# Patient Record
Sex: MALE | Race: WHITE | NOT HISPANIC OR LATINO | Employment: UNEMPLOYED | URBAN - METROPOLITAN AREA
[De-identification: names, ages, dates, MRNs, and addresses within clinical notes are randomized per-mention and may not be internally consistent; named-entity substitution may affect disease eponyms.]

---

## 2017-02-15 ENCOUNTER — OFFICE VISIT (OUTPATIENT)
Dept: FAMILY MEDICINE CLINIC | Facility: CLINIC | Age: 57
End: 2017-02-15

## 2017-02-15 VITALS
TEMPERATURE: 97.6 F | HEART RATE: 97 BPM | DIASTOLIC BLOOD PRESSURE: 80 MMHG | SYSTOLIC BLOOD PRESSURE: 140 MMHG | BODY MASS INDEX: 28.93 KG/M2 | WEIGHT: 180 LBS | RESPIRATION RATE: 18 BRPM | OXYGEN SATURATION: 92 % | HEIGHT: 66 IN

## 2017-02-15 DIAGNOSIS — Z72.0 TOBACCO ABUSE: ICD-10-CM

## 2017-02-15 DIAGNOSIS — K21.00 GASTROESOPHAGEAL REFLUX DISEASE WITH ESOPHAGITIS: ICD-10-CM

## 2017-02-15 DIAGNOSIS — J43.9 PULMONARY EMPHYSEMA, UNSPECIFIED EMPHYSEMA TYPE (HCC): ICD-10-CM

## 2017-02-15 DIAGNOSIS — R40.0 DAYTIME SOMNOLENCE: ICD-10-CM

## 2017-02-15 DIAGNOSIS — T17.308A CHOKING, INITIAL ENCOUNTER: ICD-10-CM

## 2017-02-15 DIAGNOSIS — J98.01 BRONCHOSPASM: Primary | ICD-10-CM

## 2017-02-15 PROCEDURE — 71020 XR CHEST PA AND LATERAL: CPT | Performed by: FAMILY MEDICINE

## 2017-02-15 PROCEDURE — 99214 OFFICE O/P EST MOD 30 MIN: CPT | Performed by: FAMILY MEDICINE

## 2017-02-15 RX ORDER — ALBUTEROL SULFATE 90 UG/1
2 AEROSOL, METERED RESPIRATORY (INHALATION) EVERY 4 HOURS PRN
Qty: 1 INHALER | Refills: 11 | Status: SHIPPED | OUTPATIENT
Start: 2017-02-15 | End: 2017-04-11 | Stop reason: SDUPTHER

## 2017-02-15 NOTE — PROGRESS NOTES
SUBJECTIVE:  The patient is a 56-year-old white male who is brought in by his caregiver because of choking symptoms shortness of breath and wheezing.  The patient has an old head injury.  He has a history of COPD.  At one time he was on inhalers but stopped taking them.  He continues to smoke cigarettes.  The patient himself is not an excellent historian.    PAST MEDICAL HISTORY:  Reviewed.    REVIEW OF SYSTEMS:  Please see above; 14 point ROS otherwise negative.      OBJECTIVE: Vitals signs are reviewed and are stable.    HEENT: PERRLA.   Throat and oral pharynx clear.  Old scar present over the forehead.  Neck:  Supple.    Lungs:  Bilateral rhonchi and scattered expiratory wheezes are noted.   Heart:  Regular rate and rhythm.    Abdomen:   Soft, nontender.    Extremities:  No cyanosis, clubbing or edema.    Chest x-ray is done here and interpreted by me.  Indication bronchospasm tobacco abuse.  No old x-ray for comparison.  Trace shows    ASSESSMENT:     Suspect GERD with esophagitis  Bronchospasm  Probable sleep apnea  Tobacco abuse  History of head trauma    PLAN:   The patient will have a trial of Dulera 2 puffs twice a day.  He will take albuterol 2 puffs every 4 hours when necessary wheezing.  He will be referred for sleep study to a pulmonology group.  He will take omeprazole 40 mg daily.  He is strongly advised to discontinue tobacco usage.  We will consider Chantix after current workup.  He has caregiver advised to go the emergency room if any problems.    Much of this encounter note is an electronic transcription/translation of spoken language to printed text.  The electronic translation of spoken language may permit erroneous, or at times, nonsensical words or phrases to be inadvertently transcribed.  Although I have reviewed the note for such errors, some may still exist.

## 2017-04-11 ENCOUNTER — OFFICE VISIT (OUTPATIENT)
Dept: FAMILY MEDICINE CLINIC | Facility: CLINIC | Age: 57
End: 2017-04-11

## 2017-04-11 VITALS
SYSTOLIC BLOOD PRESSURE: 168 MMHG | TEMPERATURE: 98 F | HEART RATE: 78 BPM | BODY MASS INDEX: 28.93 KG/M2 | RESPIRATION RATE: 22 BRPM | WEIGHT: 180 LBS | DIASTOLIC BLOOD PRESSURE: 90 MMHG | OXYGEN SATURATION: 89 % | HEIGHT: 66 IN

## 2017-04-11 DIAGNOSIS — J98.01 BRONCHOSPASM: ICD-10-CM

## 2017-04-11 DIAGNOSIS — H11.31 CONJUNCTIVAL HEMORRHAGE, RIGHT: Primary | ICD-10-CM

## 2017-04-11 PROCEDURE — 99213 OFFICE O/P EST LOW 20 MIN: CPT | Performed by: FAMILY MEDICINE

## 2017-04-11 PROCEDURE — 94640 AIRWAY INHALATION TREATMENT: CPT | Performed by: FAMILY MEDICINE

## 2017-04-11 RX ORDER — ALBUTEROL SULFATE 90 UG/1
2 AEROSOL, METERED RESPIRATORY (INHALATION) EVERY 4 HOURS PRN
Qty: 1 INHALER | Refills: 11 | Status: SHIPPED | OUTPATIENT
Start: 2017-04-11 | End: 2017-06-20 | Stop reason: SDUPTHER

## 2017-04-11 RX ORDER — ALBUTEROL SULFATE 2.5 MG/3ML
2.5 SOLUTION RESPIRATORY (INHALATION) ONCE
Status: COMPLETED | OUTPATIENT
Start: 2017-04-11 | End: 2017-04-11

## 2017-04-11 RX ADMIN — ALBUTEROL SULFATE 2.5 MG: 2.5 SOLUTION RESPIRATORY (INHALATION) at 10:37

## 2017-04-11 NOTE — PROGRESS NOTES
SUBJECTIVE:  The patient is a 56-year-old white male who woke up this morning with a red eye.  It is not painful.  He did not injure it.     PAST MEDICAL HISTORY:  Reviewed.    REVIEW OF SYSTEMS:  Please see above; 14 point ROS otherwise negative.      OBJECTIVE: Vitals signs are reviewed and are stable.    HEENT: PERRLA.  Discs sharp.  A sub-conjunctival redness is noted on the anterior and lateral part of the eye.  No tenderness.  Neck:  Supple.    Lungs: Diminished breath sounds bilaterally with scattered wheezes.  Heart:  Regular rate and rhythm.    Abdomen:   Soft, nontender.    Extremities:  No cyanosis, clubbing or edema.      ASSESSMENT:     Acute right sub-conjunctival hemorrhage  Bronchospasm  COPD    PLAN:   The patient has not been using his inhaler.  His O2 sat is 89.  A mini neb of albuterol was given in the office.  Observation of the sub-conjunctival hemorrhage.  I discussed the nature of this with the caregiver.  He is to use his inhaler as prescribed.  She will call if problems.  The patient will benefit from a nebulizer at home due to hypoxemia and COPD.    Much of this encounter note is an electronic transcription/translation of spoken language to printed text.  The electronic translation of spoken language may permit erroneous, or at times, nonsensical words or phrases to be inadvertently transcribed.  Although I have reviewed the note for such errors, some may still exist.

## 2017-04-17 ENCOUNTER — TELEPHONE (OUTPATIENT)
Dept: FAMILY MEDICINE CLINIC | Facility: CLINIC | Age: 57
End: 2017-04-17

## 2017-04-17 DIAGNOSIS — J98.01 BRONCHOSPASM: ICD-10-CM

## 2017-04-17 DIAGNOSIS — R09.02 HYPOXEMIA: ICD-10-CM

## 2017-04-17 DIAGNOSIS — J44.1 CHRONIC OBSTRUCTIVE PULMONARY DISEASE WITH ACUTE EXACERBATION (HCC): Primary | ICD-10-CM

## 2017-04-17 RX ORDER — ALBUTEROL SULFATE 0.63 MG/3ML
1 SOLUTION RESPIRATORY (INHALATION) EVERY 6 HOURS PRN
Qty: 100 AMPULE | Refills: 12 | Status: SHIPPED | OUTPATIENT
Start: 2017-04-17 | End: 2017-04-18 | Stop reason: SDUPTHER

## 2017-04-17 NOTE — TELEPHONE ENCOUNTER
----- Message from Blake Topete MD sent at 4/17/2017  1:56 PM EDT -----  Contact: DALI LINDA [phone]  I did this.  It seemed to have worked but you may want to check in be sure.  ----- Message -----     From: Anastasiia Burns MA     Sent: 4/17/2017   1:37 PM       To: Blake Topete MD        ----- Message -----     From: Blanca Mejias     Sent: 4/17/2017   1:24 PM       To: Anastasiia Burns MA    PATIENT HAD A BREATHING TREATMENT WHEN HE WAS IN, HE WOULD LIKE TO GET AN RX FOR THE MACHINE AT HOME.    LMOM orders faxed to [facility] and Albuterol sent to pharmacy

## 2017-04-18 RX ORDER — ALBUTEROL SULFATE 0.63 MG/3ML
1 SOLUTION RESPIRATORY (INHALATION) EVERY 6 HOURS PRN
Qty: 100 AMPULE | Refills: 12 | Status: SHIPPED | OUTPATIENT
Start: 2017-04-18 | End: 2017-06-26

## 2017-04-25 ENCOUNTER — OFFICE VISIT (OUTPATIENT)
Dept: FAMILY MEDICINE CLINIC | Facility: CLINIC | Age: 57
End: 2017-04-25

## 2017-04-25 ENCOUNTER — TELEPHONE (OUTPATIENT)
Dept: FAMILY MEDICINE CLINIC | Facility: CLINIC | Age: 57
End: 2017-04-25

## 2017-04-25 VITALS
OXYGEN SATURATION: 89 % | WEIGHT: 179 LBS | HEART RATE: 92 BPM | TEMPERATURE: 98 F | HEIGHT: 66 IN | DIASTOLIC BLOOD PRESSURE: 98 MMHG | SYSTOLIC BLOOD PRESSURE: 164 MMHG | BODY MASS INDEX: 28.77 KG/M2

## 2017-04-25 DIAGNOSIS — L03.116 CELLULITIS OF LEFT LEG: ICD-10-CM

## 2017-04-25 DIAGNOSIS — J44.9 CHRONIC OBSTRUCTIVE PULMONARY DISEASE, UNSPECIFIED COPD TYPE (HCC): Primary | ICD-10-CM

## 2017-04-25 PROCEDURE — 99213 OFFICE O/P EST LOW 20 MIN: CPT | Performed by: FAMILY MEDICINE

## 2017-04-25 RX ORDER — CEPHALEXIN 500 MG/1
500 CAPSULE ORAL 4 TIMES DAILY
Qty: 40 CAPSULE | Refills: 0 | Status: SHIPPED | OUTPATIENT
Start: 2017-04-25 | End: 2017-06-20 | Stop reason: SDUPTHER

## 2017-04-25 NOTE — PROGRESS NOTES
"SUBJECTIVE:  The patient is [] 56-year-old white male is here for follow-up.  He was seen in the emergency department at [facility] last week.  He was treated for a swollen leg.  His venous Doppler was \"normal\" however I have not seen the official copy of this at the time of this dictation.  Patient has COPD and continues to smoke.    PAST MEDICAL HISTORY:  Reviewed.    REVIEW OF SYSTEMS:  Please see above; 14 point ROS otherwise negative.      OBJECTIVE: Vitals signs are reviewed and are stable.  His O2 sats are always on the lower side.  Today they're 89  HEENT: PERRLA.  []  Neck:  Supple.  []  Lungs:  Clear.    Heart:  Regular rate and rhythm.  []  Abdomen:   Soft, nontender.  []  Extremities:  The left lower extremity has swelling of the ankle and tib-fib region.  There is redness present laterally and posteriorly.  There is tenderness to palpation laterally and posteriorly.  Homans sign is equivocal.    ASSESSMENT:    []Cellulitis left lower extremity.  COPD    PLAN:  []I will track down the official reading.  Should it be negative then the patient is prescribed Keflex 500 mg 4 times a day.  Elevate the leg.  Provider the patient will call me in a couple days to see what the progress is slight.  Should the symptoms worsen or any problems she will notify me sooner or go the emergency room.  The patient was prescribed a nebulizer machine about a week ago though it's not been delivered.  This will be checked on the day.  He will use his inhaler until the nebulizer is delivered.  This happens been confirmed will be delivered tomorrow.    Much of this encounter note is an electronic transcription/translation of spoken language to printed text.  The electronic translation of spoken language may permit erroneous, or at times, nonsensical words or phrases to be inadvertently transcribed.  Although I have reviewed the note for such errors, some may still exist.  "

## 2017-06-20 ENCOUNTER — OFFICE VISIT (OUTPATIENT)
Dept: FAMILY MEDICINE CLINIC | Facility: CLINIC | Age: 57
End: 2017-06-20

## 2017-06-20 VITALS
HEIGHT: 64 IN | WEIGHT: 174 LBS | OXYGEN SATURATION: 90 % | DIASTOLIC BLOOD PRESSURE: 100 MMHG | BODY MASS INDEX: 29.71 KG/M2 | SYSTOLIC BLOOD PRESSURE: 170 MMHG | TEMPERATURE: 97.7 F | HEART RATE: 88 BPM

## 2017-06-20 DIAGNOSIS — Z72.0 TOBACCO ABUSE: ICD-10-CM

## 2017-06-20 DIAGNOSIS — Z11.59 NEED FOR HEPATITIS C SCREENING TEST: ICD-10-CM

## 2017-06-20 DIAGNOSIS — R35.0 URINARY FREQUENCY: ICD-10-CM

## 2017-06-20 DIAGNOSIS — J44.9 CHRONIC OBSTRUCTIVE PULMONARY DISEASE, UNSPECIFIED COPD TYPE (HCC): ICD-10-CM

## 2017-06-20 DIAGNOSIS — S09.90XD HEAD TRAUMA, SUBSEQUENT ENCOUNTER: ICD-10-CM

## 2017-06-20 DIAGNOSIS — I10 ESSENTIAL HYPERTENSION: ICD-10-CM

## 2017-06-20 DIAGNOSIS — L02.11 ABSCESS, NECK: ICD-10-CM

## 2017-06-20 DIAGNOSIS — Z00.00 MEDICARE ANNUAL WELLNESS VISIT, INITIAL: Primary | ICD-10-CM

## 2017-06-20 LAB
ALBUMIN SERPL-MCNC: 3.9 G/DL (ref 3.5–5.2)
ALBUMIN/GLOB SERPL: 1.1 G/DL
ALP SERPL-CCNC: 87 U/L (ref 39–117)
ALT SERPL W P-5'-P-CCNC: 22 U/L (ref 1–41)
ANION GAP SERPL CALCULATED.3IONS-SCNC: 13.3 MMOL/L
AST SERPL-CCNC: 26 U/L (ref 1–40)
BACTERIA UR QL AUTO: NORMAL /HPF
BILIRUB SERPL-MCNC: 0.6 MG/DL (ref 0.1–1.2)
BILIRUB UR QL STRIP: NEGATIVE
BUN BLD-MCNC: 5 MG/DL (ref 6–20)
BUN/CREAT SERPL: 6.4 (ref 7–25)
CALCIUM SPEC-SCNC: 9.1 MG/DL (ref 8.6–10.5)
CHLORIDE SERPL-SCNC: 95 MMOL/L (ref 98–107)
CHOLEST SERPL-MCNC: 174 MG/DL (ref 0–200)
CLARITY UR: CLEAR
CO2 SERPL-SCNC: 33.7 MMOL/L (ref 22–29)
COLOR UR: YELLOW
CREAT BLD-MCNC: 0.78 MG/DL (ref 0.76–1.27)
ERYTHROCYTE [DISTWIDTH] IN BLOOD BY AUTOMATED COUNT: 15 % (ref 4.5–15)
GFR SERPL CREATININE-BSD FRML MDRD: 102 ML/MIN/1.73
GLOBULIN UR ELPH-MCNC: 3.4 GM/DL
GLUCOSE BLD-MCNC: 103 MG/DL (ref 65–99)
GLUCOSE UR STRIP-MCNC: NEGATIVE MG/DL
HCT VFR BLD AUTO: 52.9 % (ref 35–60)
HCV AB SER DONR QL: NORMAL
HDLC SERPL-MCNC: 48 MG/DL (ref 40–60)
HGB BLD-MCNC: 17.8 G/DL (ref 13.5–18)
HGB UR QL STRIP.AUTO: NEGATIVE
KETONES UR QL STRIP: NEGATIVE
LDLC SERPL CALC-MCNC: 97 MG/DL (ref 0–100)
LDLC/HDLC SERPL: 2.01 {RATIO}
LEUKOCYTE ESTERASE UR QL STRIP.AUTO: NEGATIVE
LYMPHOCYTES # BLD AUTO: 2.1 10*3/MM3 (ref 1.2–3.4)
LYMPHOCYTES NFR BLD AUTO: 36.9 % (ref 21–51)
MCH RBC QN AUTO: 31.4 PG (ref 26.1–33.1)
MCHC RBC AUTO-ENTMCNC: 33.7 G/DL (ref 33–37)
MCV RBC AUTO: 93 FL (ref 80–99)
MONOCYTES # BLD AUTO: 0.2 10*3/MM3 (ref 0.1–0.6)
MONOCYTES NFR BLD AUTO: 3 % (ref 2–9)
NEUTROPHILS # BLD AUTO: 3.4 10*3/MM3 (ref 1.4–6.5)
NEUTROPHILS NFR BLD AUTO: 60.1 % (ref 42–75)
NITRITE UR QL STRIP: NEGATIVE
PH UR STRIP.AUTO: 7 [PH] (ref 4.6–8)
PLATELET # BLD AUTO: 198 10*3/MM3 (ref 150–450)
PMV BLD AUTO: 6.5 FL (ref 7.1–10.5)
POTASSIUM BLD-SCNC: 5 MMOL/L (ref 3.5–5.2)
PROT SERPL-MCNC: 7.3 G/DL (ref 6–8.5)
PROT UR QL STRIP: ABNORMAL
PSA SERPL-MCNC: 0.68 NG/ML (ref 0–4)
RBC # BLD AUTO: 5.68 10*6/MM3 (ref 4–6)
RBC # UR: NORMAL /HPF
REF LAB TEST METHOD: NORMAL
SODIUM BLD-SCNC: 142 MMOL/L (ref 136–145)
SP GR UR STRIP: 1.01 (ref 1–1.03)
SQUAMOUS #/AREA URNS HPF: NORMAL /HPF
TRIGL SERPL-MCNC: 147 MG/DL (ref 0–150)
TSH SERPL DL<=0.05 MIU/L-ACNC: 2.06 MIU/ML (ref 0.27–4.2)
UROBILINOGEN UR QL STRIP: ABNORMAL
VLDLC SERPL-MCNC: 29.4 MG/DL (ref 5–40)
WBC NRBC COR # BLD: 5.6 10*3/MM3 (ref 4.5–10)
WBC UR QL AUTO: NORMAL /HPF

## 2017-06-20 PROCEDURE — G0438 PPPS, INITIAL VISIT: HCPCS | Performed by: NURSE PRACTITIONER

## 2017-06-20 PROCEDURE — 80053 COMPREHEN METABOLIC PANEL: CPT | Performed by: NURSE PRACTITIONER

## 2017-06-20 PROCEDURE — 84153 ASSAY OF PSA TOTAL: CPT | Performed by: NURSE PRACTITIONER

## 2017-06-20 PROCEDURE — 80061 LIPID PANEL: CPT | Performed by: NURSE PRACTITIONER

## 2017-06-20 PROCEDURE — 85025 COMPLETE CBC W/AUTO DIFF WBC: CPT | Performed by: NURSE PRACTITIONER

## 2017-06-20 PROCEDURE — 81001 URINALYSIS AUTO W/SCOPE: CPT | Performed by: NURSE PRACTITIONER

## 2017-06-20 PROCEDURE — 99214 OFFICE O/P EST MOD 30 MIN: CPT | Performed by: NURSE PRACTITIONER

## 2017-06-20 PROCEDURE — 84443 ASSAY THYROID STIM HORMONE: CPT | Performed by: NURSE PRACTITIONER

## 2017-06-20 PROCEDURE — 86803 HEPATITIS C AB TEST: CPT | Performed by: NURSE PRACTITIONER

## 2017-06-20 RX ORDER — LISINOPRIL 20 MG/1
20 TABLET ORAL DAILY
Qty: 30 TABLET | Refills: 1 | Status: SHIPPED | OUTPATIENT
Start: 2017-06-20 | End: 2017-07-11

## 2017-06-20 RX ORDER — ALBUTEROL SULFATE 90 UG/1
2 AEROSOL, METERED RESPIRATORY (INHALATION) EVERY 4 HOURS PRN
Qty: 1 INHALER | Refills: 11 | Status: SHIPPED | OUTPATIENT
Start: 2017-06-20 | End: 2017-06-26

## 2017-06-20 RX ORDER — CEPHALEXIN 500 MG/1
500 CAPSULE ORAL 3 TIMES DAILY
Qty: 30 CAPSULE | Refills: 0 | Status: SHIPPED | OUTPATIENT
Start: 2017-06-20 | End: 2017-07-11

## 2017-06-20 NOTE — PATIENT INSTRUCTIONS
medicare wellness today, check labs and call with results, refill albuterol, start lisinopril 20 mg daily and monitor BP at home and log, erx keflex 500 mg TID x 10 days and if persist will schedule FU with sgn for I&D, enc smoking cessation  Medicare Wellness  Personal Prevention Plan of Service     Date of Office Visit:  2017  Encounter Provider:  CELIO Benson  Place of Service:  [facility]  Patient Name: Sarkis Jaramillo  :  1960    As part of the Medicare Wellness portion of your visit today, we are providing you with this personalized preventive plan of services (PPPS). This plan is based upon recommendations of the [place] Preventive Services Task Force (USPSTF) and the Advisory Committee on Immunization Practices (ACIP).    This lists the preventive care services that should be considered, and provides dates of when you are due. Items listed as completed are up-to-date and do not require any further intervention.    Health Maintenance   Topic Date Due   • TDAP/TD VACCINES (1 - Tdap) 1977   • HEPATITIS C SCREENING  2016   • MEDICARE ANNUAL WELLNESS  2016   • INFLUENZA VACCINE  2017   • COLONOSCOPY  2027   • PNEUMOCOCCAL VACCINE (19-64 MEDIUM RISK)  Addressed       Orders Placed This Encounter   Procedures   • Comprehensive Metabolic Panel   • Lipid Panel   • TSH   • PSA   • Hepatitis C Antibody   • CBC Auto Differential   • Urinalysis   • Urinalysis, Microscopic Only   • CBC & Differential     Order Specific Question:   Manual Differential     Answer:   No   • Urinalysis With Microscopic       No Follow-up on file.

## 2017-06-20 NOTE — PROGRESS NOTES
Subjective   Sarkis Jaramillo is a 56 y.o. male.     History of Present Illness   C/o nodule on back of neck x 6 days enlarging and tender with pus and blood, here with caregiver comes three days a week and has been putting warm compresses to help open up but still there, was recently seen by podiatry last few week was on abx and prev was seen here for cellulitis on B LE 04/17, no fevers, some neck pain, no HA, with cont elevated BP, brought log from home ranging [phone] no CP dizziness HA LE edema, no prev HTN med, unclear of fam hx, poor historian s/t head trauma hx, with COPD on albuterol inhaler and nebulizer prn, request refill inhaler today, still smoking doesn't want to quit    The following portions of the patient's history were reviewed and updated as appropriate: allergies, current medications, past family history, past medical history, past social history, past surgical history and problem list.    Review of Systems   Constitutional: Negative for fever.   Respiratory: Negative for cough, shortness of breath and wheezing.    Cardiovascular: Negative for chest pain, palpitations and leg swelling.   Musculoskeletal: Positive for neck pain. Negative for neck stiffness.   Skin: Positive for wound.   Neurological: Negative for dizziness and headaches.   All other systems reviewed and are negative.      Objective   Physical Exam   Constitutional: He is oriented to person, place, and time. He appears well-developed and well-nourished.   HENT:   Head: Normocephalic and atraumatic.   Eyes: Conjunctivae and EOM are normal. Pupils are equal, round, and reactive to light.   Cardiovascular: Normal rate, regular rhythm and normal heart sounds.    Pulmonary/Chest: Effort normal. He has wheezes (throughout all lung fields).   Musculoskeletal: Normal range of motion.   Neurological: He is alert and oriented to person, place, and time.   Skin: Skin is warm and dry. There is erythema (firm bulla/nodule, with scant  bleeding, no pus dc, no rash, tender).   Psychiatric: He has a normal mood and affect. His behavior is normal. Judgment and thought content normal.   Vitals reviewed.      Assessment/Plan   Sarkis was seen today for cyst and hypertension.    Diagnoses and all orders for this visit:    Medicare annual wellness visit, initial    Abscess, neck    Essential hypertension  -     CBC & Differential  -     Comprehensive Metabolic Panel  -     Lipid Panel  -     TSH  -     Urinalysis With Microscopic  -     CBC Auto Differential  -     Urinalysis  -     Urinalysis, Microscopic Only    Need for hepatitis C screening test  -     Hepatitis C Antibody    Urinary frequency  -     PSA    Chronic obstructive pulmonary disease, unspecified COPD type    Head trauma, subsequent encounter    Tobacco abuse    Other orders  -     cephalexin (KEFLEX) 500 MG capsule; Take 1 capsule by mouth 3 (Three) Times a Day.  -     lisinopril (PRINIVIL,ZESTRIL) 20 MG tablet; Take 1 tablet by mouth Daily.  -     albuterol (PROVENTIL HFA) 108 (90 BASE) MCG/ACT inhaler; Inhale 2 puffs Every 4 (Four) Hours As Needed for Wheezing.    medicare wellness today, check labs and call with results, refill albuterol, start lisinopril 20 mg daily and monitor BP at home and log, erx keflex 500 mg TID x 10 days and if persist will schedule FU with sgn for I&D, enc smoking cessation

## 2017-06-20 NOTE — PROGRESS NOTES
QUICK REFERENCE INFORMATION:  The ABCs of the Annual Wellness Visit    Initial Medicare Wellness Visit    HEALTH RISK ASSESSMENT    1960    Recent Hospitalizations:  No hospitalization(s) within the last year. but saw [facility] 04/17 for DVT    Current Medical Providers:  Patient Care Team:  Blake Topete MD as PCP - General (Family Medicine)    Smoking Status:  History   Smoking Status   • Current Every Day Smoker   • Types: Cigarettes   Smokeless Tobacco   • Not on file   enc smoking cessation    Alcohol Consumption:  History   Alcohol Use   • Yes       Depression Screen:   PHQ-9 Depression Screening 6/20/2017   Little interest or pleasure in doing things 1   Feeling down, depressed, or hopeless 1   Trouble falling or staying asleep, or sleeping too much 0   Feeling tired or having little energy 0   Poor appetite or overeating 0   Feeling bad about yourself - or that you are a failure or have let yourself or your family down 1   Trouble concentrating on things, such as reading the newspaper or watching television 3   Moving or speaking so slowly that other people could have noticed. Or the opposite - being so fidgety or restless that you have been moving around a lot more than usual 1   Thoughts that you would be better off dead, or of hurting yourself in some way 0   PHQ-9 Total Score 7   If you checked off any problems, how difficult have these problems made it for you to do your work, take care of things at home, or get along with other people? Very difficult       Health Habits and Functional and Cognitive Screening:  Functional & Cognitive Status 6/20/2017   Do you have difficulty preparing food and eating? No   Do you have difficulty bathing yourself? No   Do you have difficulty getting dressed? No   Do you have difficulty using the toilet? No   Do you have difficulty moving around from place to place? No   In the past year have you fallen or experienced a near fall? Yes   Do you need help using the phone?   No   Are you deaf or do you have serious difficulty hearing?  No   Do you need help with transportation? Yes   Do you need help shopping? No   Do you need help preparing meals?  No   Do you need help with housework?  No   Do you need help with laundry? No   Do you need help taking your medications? Yes   Do you need help managing money? Yes   Do you have difficulty concentrating, remembering or making decisions? Yes       Health Habits  Current Diet: Unhealthy Diet  Dental Exam: Up to date  Eye Exam: Not up to date  Exercise (times per week): 7 times per week  Current Exercise Activities Include: Bicycling Outdoors    Enc overdue vision exam      Does the patient have evidence of cognitive impairment? Yes    Asiprin use counseling: Does not need ASA (and currently is not on it) doesn't want to start today, will start lisinopril 20 mg daily and RTC 1 mo recheck BP      Recent Lab Results: check labs CBC, CMP, LIPID, TSH, Hep C, PSA, UA today    Visual Acuity:  No exam data present    Age-appropriate Screening Schedule:  Refer to the list below for future screening recommendations based on patient's age, sex and/or medical conditions. Orders for these recommended tests are listed in the plan section. The patient has been provided with a written plan.    Health Maintenance   Topic Date Due   • TDAP/TD VACCINES (1 - Tdap) 12/17/1977   • INFLUENZA VACCINE  08/01/2017   • COLONOSCOPY  04/11/2027   • PNEUMOCOCCAL VACCINE (19-64 MEDIUM RISK)  Addressed        Subjective   History of Present Illness    Sarkis Jaramillo is a 56 y.o. male who presents for an Annual Wellness Visit.    The following portions of the patient's history were reviewed and updated as appropriate: allergies, current medications, past family history, past medical history, past social history, past surgical history and problem list.    Outpatient Medications Prior to Visit   Medication Sig Dispense Refill   • albuterol (ACCUNEB) 0.63 MG/3ML nebulizer  "solution Take 3 mL by nebulization Every 6 (Six) Hours As Needed for Wheezing. DX codes J44.1 R09.02 J98.01 100 ampule 12   • albuterol (PROVENTIL HFA) 108 (90 BASE) MCG/ACT inhaler Inhale 2 puffs Every 4 (Four) Hours As Needed for Wheezing. 1 inhaler 11   • cephalexin (KEFLEX) 500 MG capsule Take 1 capsule by mouth 4 (Four) Times a Day. 40 capsule 0     No facility-administered medications prior to visit.        Patient Active Problem List   Diagnosis   • Head trauma   • COPD (chronic obstructive pulmonary disease)       Advance Care Planning:  has an advance directive - a copy HAS NOT been provided has guardian    Identification of Risk Factors:  Risk factors include: weight , unhealthy diet, cardiovascular risk, tobacco use, alcohol use, increased fall risk, depression and cognitive impairment.    Review of Systems    Compared to one year ago, the patient feels his physical health is the same.  Compared to one year ago, the patient feels his mental health is the same.    Objective     Physical Exam    Vitals:    06/20/17 0917   BP: 170/100   BP Location: Left arm   Patient Position: Sitting   Cuff Size: Small Adult   Pulse: 88   Temp: 97.7 °F (36.5 °C)   TempSrc: Oral   SpO2: 90%   Weight: 174 lb (78.9 kg)   Height: 64\" (162.6 cm)   PainSc:   6   PainLoc: Neck       Body mass index is 29.87 kg/(m^2).  Discussed the patient's BMI with him. The BMI is above average; BMI management plan is completed.    Assessment/Plan   Patient Self-Management and Personalized Health Advice  The patient has been provided with information about: diet, exercise, weight management, prevention of cardiac or vascular disease, tobacco cessation, alcohol use, fall prevention and mental health concerns and preventive services including:   · Fall Risk assessment done, Glaucoma screening recommended, Prostate cancer screening discussed, Smoking cessation counseling completed.    Visit Diagnoses:    ICD-10-CM ICD-9-CM   1. Medicare annual " wellness visit, initial Z00.00 V70.0   2. Abscess, neck L02.11 682.1   3. Essential hypertension I10 401.9   4. Need for hepatitis C screening test Z11.59 V73.89   5. Urinary frequency R35.0 788.41   6. Chronic obstructive pulmonary disease, unspecified COPD type J44.9 496   7. Head trauma, subsequent encounter S09.90XD V58.89     959.01   8. Tobacco abuse Z72.0 305.1       Orders Placed This Encounter   Procedures   • Comprehensive Metabolic Panel   • Lipid Panel   • TSH   • PSA   • Hepatitis C Antibody   • CBC Auto Differential   • Urinalysis   • Urinalysis, Microscopic Only   • CBC & Differential     Order Specific Question:   Manual Differential     Answer:   No   • Urinalysis With Microscopic       Outpatient Encounter Prescriptions as of 6/20/2017   Medication Sig Dispense Refill   • albuterol (ACCUNEB) 0.63 MG/3ML nebulizer solution Take 3 mL by nebulization Every 6 (Six) Hours As Needed for Wheezing. DX codes J44.1 R09.02 J98.01 100 ampule 12   • albuterol (PROVENTIL HFA) 108 (90 BASE) MCG/ACT inhaler Inhale 2 puffs Every 4 (Four) Hours As Needed for Wheezing. 1 inhaler 11   • [DISCONTINUED] albuterol (PROVENTIL HFA) 108 (90 BASE) MCG/ACT inhaler Inhale 2 puffs Every 4 (Four) Hours As Needed for Wheezing. 1 inhaler 11   • cephalexin (KEFLEX) 500 MG capsule Take 1 capsule by mouth 3 (Three) Times a Day. 30 capsule 0   • lisinopril (PRINIVIL,ZESTRIL) 20 MG tablet Take 1 tablet by mouth Daily. 30 tablet 1   • [DISCONTINUED] cephalexin (KEFLEX) 500 MG capsule Take 1 capsule by mouth 4 (Four) Times a Day. 40 capsule 0     No facility-administered encounter medications on file as of 6/20/2017.        Reviewed use of high risk medication in the elderly: yes  Reviewed for potential of harmful drug interactions in the elderly: yes    Follow Up:  Return in about 4 weeks (around 7/18/2017).     An After Visit Summary and PPPS with all of these plans were given to the patient.

## 2017-06-23 ENCOUNTER — TELEPHONE (OUTPATIENT)
Dept: FAMILY MEDICINE CLINIC | Facility: CLINIC | Age: 57
End: 2017-06-23

## 2017-06-23 NOTE — TELEPHONE ENCOUNTER
Thyroid, kidney, liver, prostate normal. Chol well controlled. Hep C screening negative, normal. BS sl elevated enc healthy diet and regular exercise for BS control and weight loss, we will cont to monitor. How is nodule on neck on abx?

## 2017-06-26 ENCOUNTER — TELEPHONE (OUTPATIENT)
Dept: FAMILY MEDICINE CLINIC | Facility: CLINIC | Age: 57
End: 2017-06-26

## 2017-06-26 NOTE — TELEPHONE ENCOUNTER
Please cont washing with soap and water and cont abx, if still there after abx I can refer you to [facility]

## 2017-07-10 ENCOUNTER — TELEPHONE (OUTPATIENT)
Dept: FAMILY MEDICINE CLINIC | Facility: CLINIC | Age: 57
End: 2017-07-10

## 2017-07-10 NOTE — TELEPHONE ENCOUNTER
WANTS TO BE SEEN TOMORROW OR THE DAY AFTER. PT FELL AND INJURED HIS LEFT FOOT AND HIS WOUND LOOKS LIKE IT IS GETTING INFECTED. CAN WE WORK HIM IN?

## 2017-07-11 ENCOUNTER — OFFICE VISIT (OUTPATIENT)
Dept: FAMILY MEDICINE CLINIC | Facility: CLINIC | Age: 57
End: 2017-07-11

## 2017-07-11 VITALS
HEART RATE: 88 BPM | DIASTOLIC BLOOD PRESSURE: 88 MMHG | TEMPERATURE: 97.8 F | RESPIRATION RATE: 18 BRPM | OXYGEN SATURATION: 91 % | BODY MASS INDEX: 29.71 KG/M2 | SYSTOLIC BLOOD PRESSURE: 140 MMHG | WEIGHT: 174 LBS | HEIGHT: 64 IN

## 2017-07-11 DIAGNOSIS — L08.9 WOUND INFECTION: Primary | ICD-10-CM

## 2017-07-11 DIAGNOSIS — T14.8XXA WOUND INFECTION: Primary | ICD-10-CM

## 2017-07-11 DIAGNOSIS — I10 ESSENTIAL HYPERTENSION: ICD-10-CM

## 2017-07-11 PROCEDURE — 99213 OFFICE O/P EST LOW 20 MIN: CPT | Performed by: FAMILY MEDICINE

## 2017-07-11 RX ORDER — LISINOPRIL 20 MG/1
TABLET ORAL
Qty: 45 TABLET | Refills: 11 | Status: SHIPPED | OUTPATIENT
Start: 2017-07-11 | End: 2018-05-17

## 2017-07-11 RX ORDER — DOXYCYCLINE HYCLATE 100 MG/1
100 CAPSULE ORAL 2 TIMES DAILY
Qty: 20 CAPSULE | Refills: 0 | Status: SHIPPED | OUTPATIENT
Start: 2017-07-11 | End: 2018-05-17

## 2017-07-11 NOTE — PROGRESS NOTES
SUBJECTIVE:  The patient is a 56-year-old white male who was had a wound from a fall on the kitchen over the last week.  The area affected his right lower extremity.  He had a cyst on his neck treated with Keflex on June 20.  It is better but not resolved.  His caregiver states his blood pressure is been running high in spite of the fact he is on lisinopril 20 mg.  Today in the office is 140/88.  No fever or chills.  He is status post rheumatic brain injury.    PAST MEDICAL HISTORY:  Reviewed.    REVIEW OF SYSTEMS:  Please see above; 14 point ROS otherwise negative.      OBJECTIVE: Vitals signs are reviewed and are stable.    HEENT: PERRLA.    Neck:  Supple.  Posterior cervical cyst is noted was mild induration.  No drainage.  Lungs:  Clear.    Heart:  Regular rate and rhythm.    Abdomen:   Soft, nontender.    Extremities:  Right lower extremity reveals a 3 cm abrasion in the anterior distal tib-fib region.  There is some redness surrounding this.  No exudate noted.  There is a scabbed abrasion just superior to this about 1.5 cm.  Second abrasion does not look infected.    ASSESSMENT:    Hypertension  Wound infection right lower extremity  Cervical cyst with secondary infection-improved    PLAN:  Patient will be treated with doxycycline 100 mg twice a day.  He is to remain off his feet keep his leg elevated.  He will increase his lisinopril to 30 mg daily and monitor the blood pressure.  Topical antibiotic ointment to both neck and wound on right lower leg.  Caregiver will notify us in a couple days how things are going.  Should symptoms worsen he's advised to go the emergency room.    Much of this encounter note is an electronic transcription/translation of spoken language to printed text.  The electronic translation of spoken language may permit erroneous, or at times, nonsensical words or phrases to be inadvertently transcribed.  Although I have reviewed the note for such errors, some may still exist.

## 2017-09-27 ENCOUNTER — HOSPITAL ENCOUNTER (EMERGENCY)
Facility: HOSPITAL | Age: 57
Discharge: HOME | End: 2017-09-27
Attending: EMERGENCY MEDICINE | Admitting: EMERGENCY MEDICINE

## 2017-09-27 ENCOUNTER — APPOINTMENT (OUTPATIENT)
Dept: CT IMAGING | Facility: HOSPITAL | Age: 57
End: 2017-09-27

## 2017-09-27 VITALS
WEIGHT: 160 LBS | HEART RATE: 79 BPM | DIASTOLIC BLOOD PRESSURE: 84 MMHG | HEIGHT: 67 IN | SYSTOLIC BLOOD PRESSURE: 138 MMHG | BODY MASS INDEX: 25.11 KG/M2 | RESPIRATION RATE: 16 BRPM | OXYGEN SATURATION: 99 % | TEMPERATURE: 98.1 F
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
## 2017-09-27 DIAGNOSIS — S01.81XA FOREHEAD LACERATION, INITIAL ENCOUNTER: Primary | ICD-10-CM

## 2017-09-27 PROCEDURE — 99282 EMERGENCY DEPT VISIT SF MDM: CPT

## 2017-09-27 PROCEDURE — 70450 CT HEAD/BRAIN W/O DYE: CPT

## 2017-09-27 RX ORDER — LIDOCAINE HYDROCHLORIDE 10 MG/ML
10 INJECTION, SOLUTION INFILTRATION; PERINEURAL ONCE
Status: COMPLETED | OUTPATIENT
Start: 2017-09-27 | End: 2017-09-27

## 2017-09-27 RX ADMIN — LIDOCAINE HYDROCHLORIDE 3 ML: 10 INJECTION, SOLUTION INFILTRATION; PERINEURAL at 13:20

## 2017-09-28 ENCOUNTER — EPISODE CHANGES (OUTPATIENT)
Dept: CASE MANAGEMENT | Facility: OTHER | Age: 57
End: 2017-09-28

## 2017-10-03 ENCOUNTER — PATIENT OUTREACH (OUTPATIENT)
Dept: CASE MANAGEMENT | Facility: OTHER | Age: 57
End: 2017-10-03

## 2017-10-05 ENCOUNTER — OFFICE VISIT (OUTPATIENT)
Dept: FAMILY MEDICINE CLINIC | Facility: CLINIC | Age: 57
End: 2017-10-05

## 2017-10-05 VITALS
WEIGHT: 172 LBS | HEIGHT: 68 IN | TEMPERATURE: 98.1 F | OXYGEN SATURATION: 98 % | BODY MASS INDEX: 26.07 KG/M2 | HEART RATE: 91 BPM | SYSTOLIC BLOOD PRESSURE: 126 MMHG | DIASTOLIC BLOOD PRESSURE: 70 MMHG

## 2017-10-05 DIAGNOSIS — S01.81XD LACERATION OF OTHER PART OF HEAD WITHOUT FOREIGN BODY, SUBSEQUENT ENCOUNTER: Primary | ICD-10-CM

## 2017-10-05 DIAGNOSIS — Z48.02 ENCOUNTER FOR REMOVAL OF SUTURES: ICD-10-CM

## 2017-10-05 PROCEDURE — 99213 OFFICE O/P EST LOW 20 MIN: CPT | Performed by: NURSE PRACTITIONER

## 2017-10-05 NOTE — PROGRESS NOTES
Subjective   Sarkis Jaramillo is a 56 y.o. male.     History of Present Illness   Here today for suture removal, he went to [facility] on 9/27/17 for forehead laceration s/p fall, CT head negative per ER report pleaserfer to for details. His wife states she tried to get them out yesterday but was unsuccessful. Denies bleeding or drainage from lac on forehead. Was told to have sutures removed 1 week from when placed.   The following portions of the patient's history were reviewed and updated as appropriate: allergies, current medications, past family history, past medical history, past social history, past surgical history and problem list.    Review of Systems   Constitutional: Negative for chills, diaphoresis and fever.   Respiratory: Negative for shortness of breath.    Cardiovascular: Negative for chest pain.   Musculoskeletal: Negative for arthralgias and myalgias.   Skin: Positive for wound.   Neurological: Negative for dizziness, light-headedness and headaches.   All other systems reviewed and are negative.      Objective   Physical Exam   Constitutional: He is oriented to person, place, and time. He appears well-developed and well-nourished.   HENT:   Head: Normocephalic.   Eyes: Pupils are equal, round, and reactive to light.   Neck: Neck supple.   Cardiovascular: Normal rate, regular rhythm and normal heart sounds.    Pulmonary/Chest: Effort normal and breath sounds normal.   Musculoskeletal: Normal range of motion.   Neurological: He is alert and oriented to person, place, and time.   Skin: Skin is warm and dry. Laceration noted. There is erythema.        Psychiatric: He has a normal mood and affect. His behavior is normal. Judgment and thought content normal.   Nursing note and vitals reviewed.      Assessment/Plan   Sarkis was seen today for suture / staple removal.    Diagnoses and all orders for this visit:    Laceration of other part of head without foreign body, subsequent encounter    Encounter for  removal of sutures      D/t heavy scab over sutures, advised patient and family to return to office tomorrow for suture removal, he was instructed to wash laceration, apply wet clean compress to scab to loosen dried fluid, and will return tomorrow for removal. Advised not to try to remove at home.  Increase fluid intake, get plenty of rest.   Patient agrees with plan of care and understands instructions. Call if worsening symptoms or any problems or concerns.

## 2017-10-05 NOTE — PROGRESS NOTES
Subjective   Sarkis Jaramillo is a 56 y.o. male.     History of Present Illness     {Common H&P Review Areas:42488}    Review of Systems    Objective   Physical Exam    Assessment/Plan   {Assess/PlanSmartLinks:18871}

## 2017-10-05 NOTE — PATIENT INSTRUCTIONS
D/t heavy scab over sutures, advised patient and family to return to office tomorrow for suture removal, he was instructed to wash laceration, apply wet clean compress to scab to loosen dried fluid, and will return tomorrow for removal. Advised not to try to remove at home.  Increase fluid intake, get plenty of rest.   Patient agrees with plan of care and understands instructions. Call if worsening symptoms or any problems or concerns.

## 2017-10-06 ENCOUNTER — TELEPHONE (OUTPATIENT)
Dept: FAMILY MEDICINE CLINIC | Facility: CLINIC | Age: 57
End: 2017-10-06

## 2017-10-06 ENCOUNTER — TELEPHONE (OUTPATIENT)
Dept: SOCIAL WORK | Facility: HOSPITAL | Age: 57
End: 2017-10-06

## 2017-10-06 ENCOUNTER — OFFICE VISIT (OUTPATIENT)
Dept: FAMILY MEDICINE CLINIC | Facility: CLINIC | Age: 57
End: 2017-10-06

## 2017-10-06 VITALS
HEIGHT: 68 IN | TEMPERATURE: 98.4 F | RESPIRATION RATE: 20 BRPM | HEART RATE: 80 BPM | WEIGHT: 172 LBS | DIASTOLIC BLOOD PRESSURE: 78 MMHG | BODY MASS INDEX: 26.07 KG/M2 | SYSTOLIC BLOOD PRESSURE: 128 MMHG | OXYGEN SATURATION: 90 %

## 2017-10-06 DIAGNOSIS — S01.91XD LACERATION OF HEAD WITHOUT FOREIGN BODY, UNSPECIFIED PART OF HEAD, SUBSEQUENT ENCOUNTER: ICD-10-CM

## 2017-10-06 DIAGNOSIS — Z48.02 ENCOUNTER FOR REMOVAL OF SUTURES: Primary | ICD-10-CM

## 2017-10-06 PROCEDURE — 99213 OFFICE O/P EST LOW 20 MIN: CPT | Performed by: NURSE PRACTITIONER

## 2017-10-06 RX ORDER — BACITRACIN, NEOMYCIN, POLYMYXIN B 400; 3.5; 5 [USP'U]/G; MG/G; [USP'U]/G
OINTMENT TOPICAL 2 TIMES DAILY
Qty: 15 G | Refills: 0 | Status: SHIPPED | OUTPATIENT
Start: 2017-10-06

## 2017-10-06 RX ORDER — CEPHALEXIN 500 MG/1
500 CAPSULE ORAL 3 TIMES DAILY
Qty: 21 CAPSULE | Refills: 0 | Status: SHIPPED | OUTPATIENT
Start: 2017-10-06 | End: 2019-01-01

## 2017-10-06 NOTE — PROGRESS NOTES
Subjective   Sarkis Jaramillo is a 56 y.o. male.     History of Present Illness   Here today for f/u for suture removal, he states he did apply damp cloth to help loosen scab. He had 6 sutures placed on 9/27/17 left head laceration s/p fall at [facility], report reviewed.   The following portions of the patient's history were reviewed and updated as appropriate: allergies, current medications, past family history, past medical history, past social history, past surgical history and problem list.    Review of Systems   Constitutional: Negative for chills, diaphoresis and fever.   Respiratory: Negative for shortness of breath.    Cardiovascular: Negative for chest pain.   Musculoskeletal: Negative for arthralgias and myalgias.   Skin: Positive for wound.   Neurological: Negative for dizziness, light-headedness and headaches.   All other systems reviewed and are negative.      Objective   Physical Exam   Constitutional: He is oriented to person, place, and time. He appears well-developed and well-nourished.   HENT:   Head: Normocephalic.   Eyes: Pupils are equal, round, and reactive to light.   Neck: Neck supple.   Cardiovascular: Normal rate, regular rhythm and normal heart sounds.    Pulmonary/Chest: Effort normal and breath sounds normal.   Musculoskeletal: Normal range of motion.   Neurological: He is alert and oriented to person, place, and time.   Skin: Skin is warm and dry. Laceration noted. There is erythema.        Psychiatric: He has a normal mood and affect. His behavior is normal. Judgment and thought content normal.   Nursing note and vitals reviewed.      Assessment/Plan   Sarkis was seen today for suture / staple removal.    Diagnoses and all orders for this visit:    Encounter for removal of sutures  -     Suture Removal    Other orders  -     Cancel: ECG 12 Lead  -     cephalexin (KEFLEX) 500 MG capsule; Take 1 capsule by mouth 3 (Three) Times a Day.  -     neomycin-bacitracin-polymyxin (NEOSPORIN)  400-5-5000 ointment; Apply  topically 2 (Two) Times a Day.        Start keflex 500mg 3x day for 7 days.  Keep wound clean and dry, educated patient and friend in room about wound care, sutures removed, apply abx ointment 2x day and cover with clean dry dressing. Avoid picking, keep hair out of wound.  Refer to plastics for wound healing and location of wound, will call with appt.  Patient and his friend understand these instructions.   Increase fluid intake, get plenty of rest.   Patient agrees with plan of care and understands instructions. Call if worsening symptoms or any problems or concerns.

## 2017-10-06 NOTE — PATIENT INSTRUCTIONS
Start keflex 500mg 3x day for 7 days.  Keep wound clean and dry, educated patient and friend in room about wound care, sutures removed, apply abx ointment 2x day and cover with clean dry dressing. Avoid picking, keep hair out of wound.  Refer to plastics for wound healing and location of wound, will call with appt.  Patient and his friend understand these instructions.   Increase fluid intake, get plenty of rest.   Patient agrees with plan of care and understands instructions. Call if worsening symptoms or any problems or concerns.

## 2017-10-06 NOTE — TELEPHONE ENCOUNTER
ER F/U phone call:   Spoke with pt, who states that he is doing well, he saw his PCP today (10-6-17). Denies any other c/o. No further questions or concerns voiced at this time. Lana Dietrich RN

## 2017-10-06 NOTE — TELEPHONE ENCOUNTER
Please call patient, will refer him to plastic surgery for wound care and he will be called with appt. Plastics because of where his laceration is located. Cont wit abx and dressing and ointment.

## 2017-10-06 NOTE — PROGRESS NOTES
"Procedure   Suture Removal  Date/Time: 10/6/2017 8:14 AM  Performed by: DEANN PEARCE  Authorized by: DEANN PEARCE   Consent: Verbal consent obtained.  Risks and benefits: risks, benefits and alternatives were discussed  Consent given by: patient  Patient understanding: patient states understanding of the procedure being performed  Imaging studies: imaging studies not available  Patient identity confirmed: verbally with patient  Time out: Immediately prior to procedure a \"time out\" was called to verify the correct patient, procedure, equipment, support staff and site/side marked as required.  Body area: head/neck  Location details: forehead  Wound Appearance: red and pink  Sutures Removed: 4  Post-removal: dressing applied and antibiotic ointment applied  Patient tolerance: Patient tolerated the procedure well with no immediate complications           "

## 2017-10-06 NOTE — PROGRESS NOTES
"Procedure   Suture Removal  Date/Time: 10/5/2017 2:15 PM  Performed by: DEANN PEARCE  Authorized by: DEANN PEARCE   Consent: Verbal consent obtained.  Risks and benefits: risks, benefits and alternatives were discussed  Consent given by: patient  Patient understanding: patient states understanding of the procedure being performed  Patient identity confirmed: verbally with patient  Time out: Immediately prior to procedure a \"time out\" was called to verify the correct patient, procedure, equipment, support staff and site/side marked as required.  Body area: head/neck  Location details: forehead  Wound Appearance: red, tender and draining  Sutures Removed: 1  Post-removal: dressing applied and antibiotic ointment applied  Patient tolerance: Patient tolerated the procedure well with no immediate complications           "

## 2018-02-02 ENCOUNTER — PATIENT OUTREACH (OUTPATIENT)
Dept: CASE MANAGEMENT | Facility: OTHER | Age: 58
End: 2018-02-02

## 2018-02-02 NOTE — OUTREACH NOTE
Discussed need to follow-up with routine physician appointments with patient and his therapist.  No needs or concerns voiced today.

## 2018-03-01 ENCOUNTER — PRIOR AUTHORIZATION (OUTPATIENT)
Dept: FAMILY MEDICINE CLINIC | Facility: CLINIC | Age: 58
End: 2018-03-01

## 2018-03-01 NOTE — TELEPHONE ENCOUNTER
03/01 Did appeal for albuterol nebulizer, it was denied for copd, not covered under medicare part d.

## 2018-04-30 ENCOUNTER — OFFICE VISIT (OUTPATIENT)
Dept: FAMILY MEDICINE CLINIC | Facility: CLINIC | Age: 58
End: 2018-04-30

## 2018-04-30 VITALS
HEIGHT: 68 IN | TEMPERATURE: 98.3 F | OXYGEN SATURATION: 90 % | HEART RATE: 74 BPM | DIASTOLIC BLOOD PRESSURE: 98 MMHG | SYSTOLIC BLOOD PRESSURE: 147 MMHG | WEIGHT: 164 LBS | BODY MASS INDEX: 24.86 KG/M2

## 2018-04-30 DIAGNOSIS — R32 URINARY INCONTINENCE, UNSPECIFIED TYPE: Primary | ICD-10-CM

## 2018-04-30 DIAGNOSIS — Z72.0 TOBACCO ABUSE: ICD-10-CM

## 2018-04-30 DIAGNOSIS — I10 ESSENTIAL HYPERTENSION: ICD-10-CM

## 2018-04-30 DIAGNOSIS — N42.9 ABNORMAL PROSTATE ON PHYSICAL EXAMINATION: ICD-10-CM

## 2018-04-30 DIAGNOSIS — Z12.9 SPECIAL SCREENING FOR MALIGNANT NEOPLASM: ICD-10-CM

## 2018-04-30 LAB
ALBUMIN SERPL-MCNC: 4.4 G/DL (ref 3.5–5.2)
ALBUMIN/GLOB SERPL: 1.5 G/DL
ALP SERPL-CCNC: 83 U/L (ref 39–117)
ALT SERPL W P-5'-P-CCNC: 26 U/L (ref 1–41)
ANION GAP SERPL CALCULATED.3IONS-SCNC: 14 MMOL/L
AST SERPL-CCNC: 28 U/L (ref 1–40)
BACTERIA UR QL AUTO: ABNORMAL /HPF
BILIRUB SERPL-MCNC: 0.3 MG/DL (ref 0.1–1.2)
BILIRUB UR QL STRIP: NEGATIVE
BUN BLD-MCNC: 6 MG/DL (ref 6–20)
BUN/CREAT SERPL: 8 (ref 7–25)
CALCIUM SPEC-SCNC: 9.5 MG/DL (ref 8.6–10.5)
CHLORIDE SERPL-SCNC: 95 MMOL/L (ref 98–107)
CLARITY UR: CLEAR
CO2 SERPL-SCNC: 31 MMOL/L (ref 22–29)
COLOR UR: YELLOW
CREAT BLD-MCNC: 0.75 MG/DL (ref 0.76–1.27)
DEVELOPER EXPIRATION DATE: NORMAL
DEVELOPER LOT NUMBER: NORMAL
ERYTHROCYTE [DISTWIDTH] IN BLOOD BY AUTOMATED COUNT: 15.3 % (ref 4.5–15)
EXPIRATION DATE: NORMAL
FECAL OCCULT BLOOD SCREEN, POC: NEGATIVE
GFR SERPL CREATININE-BSD FRML MDRD: 107 ML/MIN/1.73
GLOBULIN UR ELPH-MCNC: 3 GM/DL
GLUCOSE BLD-MCNC: 94 MG/DL (ref 65–99)
GLUCOSE UR STRIP-MCNC: NEGATIVE MG/DL
HCT VFR BLD AUTO: 52.6 % (ref 35–60)
HGB BLD-MCNC: 18 G/DL (ref 13.5–18)
HGB UR QL STRIP.AUTO: NEGATIVE
KETONES UR QL STRIP: NEGATIVE
LEUKOCYTE ESTERASE UR QL STRIP.AUTO: ABNORMAL
LYMPHOCYTES # BLD AUTO: 2.4 10*3/MM3 (ref 1.2–3.4)
LYMPHOCYTES NFR BLD AUTO: 40.3 % (ref 21–51)
Lab: NORMAL
MCH RBC QN AUTO: 32.2 PG (ref 26.1–33.1)
MCHC RBC AUTO-ENTMCNC: 34.3 G/DL (ref 33–37)
MCV RBC AUTO: 93.9 FL (ref 80–99)
MONOCYTES # BLD AUTO: 0.5 10*3/MM3 (ref 0.1–0.6)
MONOCYTES NFR BLD AUTO: 8 % (ref 2–9)
NEGATIVE CONTROL: NEGATIVE
NEUTROPHILS # BLD AUTO: 3.1 10*3/MM3 (ref 1.4–6.5)
NEUTROPHILS NFR BLD AUTO: 51.7 % (ref 42–75)
NITRITE UR QL STRIP: NEGATIVE
PH UR STRIP.AUTO: 6.5 [PH] (ref 4.6–8)
PLATELET # BLD AUTO: 191 10*3/MM3 (ref 150–450)
PMV BLD AUTO: 7.2 FL (ref 7.1–10.5)
POSITIVE CONTROL: POSITIVE
POTASSIUM BLD-SCNC: 4.5 MMOL/L (ref 3.5–5.2)
PROT SERPL-MCNC: 7.4 G/DL (ref 6–8.5)
PROT UR QL STRIP: NEGATIVE
PSA SERPL-MCNC: 0.85 NG/ML (ref 0–4)
RBC # BLD AUTO: 5.6 10*6/MM3 (ref 4–6)
RBC # UR: ABNORMAL /HPF
REF LAB TEST METHOD: ABNORMAL
SODIUM BLD-SCNC: 140 MMOL/L (ref 136–145)
SP GR UR STRIP: 1.01 (ref 1–1.03)
SQUAMOUS #/AREA URNS HPF: ABNORMAL /HPF
UROBILINOGEN UR QL STRIP: ABNORMAL
WBC NRBC COR # BLD: 5.9 10*3/MM3 (ref 4.5–10)
WBC UR QL AUTO: ABNORMAL /HPF

## 2018-04-30 PROCEDURE — 85025 COMPLETE CBC W/AUTO DIFF WBC: CPT | Performed by: FAMILY MEDICINE

## 2018-04-30 PROCEDURE — 84153 ASSAY OF PSA TOTAL: CPT | Performed by: FAMILY MEDICINE

## 2018-04-30 PROCEDURE — 81001 URINALYSIS AUTO W/SCOPE: CPT | Performed by: FAMILY MEDICINE

## 2018-04-30 PROCEDURE — 99214 OFFICE O/P EST MOD 30 MIN: CPT | Performed by: FAMILY MEDICINE

## 2018-04-30 PROCEDURE — 82274 ASSAY TEST FOR BLOOD FECAL: CPT | Performed by: FAMILY MEDICINE

## 2018-04-30 PROCEDURE — 36415 COLL VENOUS BLD VENIPUNCTURE: CPT | Performed by: FAMILY MEDICINE

## 2018-04-30 PROCEDURE — 80053 COMPREHEN METABOLIC PANEL: CPT | Performed by: FAMILY MEDICINE

## 2018-04-30 RX ORDER — ALBUTEROL SULFATE 1.25 MG/3ML
1 SOLUTION RESPIRATORY (INHALATION) EVERY 6 HOURS PRN
COMMUNITY
End: 2019-01-01 | Stop reason: SDUPTHER

## 2018-04-30 RX ORDER — LISINOPRIL 20 MG/1
TABLET ORAL
Qty: 60 TABLET | Refills: 1 | Status: SHIPPED | OUTPATIENT
Start: 2018-04-30 | End: 2018-05-17 | Stop reason: DRUGHIGH

## 2018-04-30 NOTE — PROGRESS NOTES
SUBJECTIVE:  The patient is a 57-year-old white male is brought in by one of his caregivers because she feels he is having urinary incontinence.  She states he drinks a lot of beer at night is having trouble controlling his bladder.  The patient denies any symptoms.  No dysuria or frequency.  He doesn't think he is having trouble controlling his urine.  No fever or chills.  He continues to smoke cigarettes.    PAST MEDICAL HISTORY:  Reviewed.    REVIEW OF SYSTEMS:  Please see above; 14 point ROS negative.      OBJECTIVE: Vitals signs are reviewed and are stable.    HEENT: PERRLA.   Neck:  Supple.   Lungs:  Clear.    Heart:  Regular rate and rhythm.   Abdomen:   Soft, nontender.  Genitalia: Normal male.  No scrotal masses or tenderness.  Rectal: Prostate soft 2+ nontender no masses.  Hemoccult is pending   Extremities:  No cyanosis, clubbing or edema.     ASSESSMENT:   Urinary incontinence   Prostate exam  Tobacco abuse  History of traumatic brain injury  Hypertension-not at goal    PLAN: CBC CMP and urinalysis PSA ordered.  Patient's caregivers will follow-up on results.  We'll refer to urologist.  Advised to quit smoking .  He will increase his lisinopril from 30 mg to 40 mg daily..he will monitor his blood pressure..  And I will be informed on what the readings are in the next couple weeks.  Follow up after labs.    Dictated utilizing Dragon dictation.

## 2018-05-17 ENCOUNTER — OFFICE VISIT (OUTPATIENT)
Dept: FAMILY MEDICINE CLINIC | Facility: CLINIC | Age: 58
End: 2018-05-17

## 2018-05-17 VITALS
BODY MASS INDEX: 25.19 KG/M2 | RESPIRATION RATE: 16 BRPM | HEART RATE: 81 BPM | SYSTOLIC BLOOD PRESSURE: 130 MMHG | HEIGHT: 68 IN | OXYGEN SATURATION: 91 % | DIASTOLIC BLOOD PRESSURE: 80 MMHG | WEIGHT: 166.2 LBS | TEMPERATURE: 98.5 F

## 2018-05-17 DIAGNOSIS — I10 ESSENTIAL HYPERTENSION: ICD-10-CM

## 2018-05-17 DIAGNOSIS — S42.002D CLOSED DISPLACED FRACTURE OF LEFT CLAVICLE WITH ROUTINE HEALING, UNSPECIFIED PART OF CLAVICLE, SUBSEQUENT ENCOUNTER: Primary | ICD-10-CM

## 2018-05-17 PROCEDURE — 99213 OFFICE O/P EST LOW 20 MIN: CPT | Performed by: NURSE PRACTITIONER

## 2018-05-17 RX ORDER — HYDROCODONE BITARTRATE AND ACETAMINOPHEN 5; 325 MG/1; MG/1
1 TABLET ORAL
COMMUNITY
Start: 2018-05-13 | End: 2019-01-01

## 2018-05-17 RX ORDER — LISINOPRIL 40 MG/1
40 TABLET ORAL DAILY
Qty: 30 TABLET | Refills: 6 | Status: SHIPPED | OUTPATIENT
Start: 2018-05-17 | End: 2019-01-04 | Stop reason: SDUPTHER
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
## 2018-05-17 NOTE — PATIENT INSTRUCTIONS
Refer to ortho, will call with appt.   May try tylenol or ibuprofen as tolerated OTC as needed for pain.  Elevate arm, apply ice to shoulder on 20 min off 1 hour.  Refilled lisinopril 40 min 1 tablet daily in am. Monitor BP.   Increase fluid intake, get plenty of rest.   Patient agrees with plan of care and understands instructions. Call if worsening symptoms or any problems or concerns.

## 2018-10-09 ENCOUNTER — EPISODE CHANGES (OUTPATIENT)
Dept: CASE MANAGEMENT | Facility: OTHER | Age: 58
End: 2018-10-09

## 2019-01-01 ENCOUNTER — TELEPHONE (OUTPATIENT)
Dept: FAMILY MEDICINE CLINIC | Facility: CLINIC | Age: 59
End: 2019-01-01

## 2019-01-01 ENCOUNTER — OFFICE VISIT (OUTPATIENT)
Dept: FAMILY MEDICINE CLINIC | Facility: CLINIC | Age: 59
End: 2019-01-01

## 2019-01-01 VITALS
BODY MASS INDEX: 24.55 KG/M2 | DIASTOLIC BLOOD PRESSURE: 82 MMHG | WEIGHT: 162 LBS | SYSTOLIC BLOOD PRESSURE: 140 MMHG | HEIGHT: 68 IN | TEMPERATURE: 97.7 F | HEART RATE: 93 BPM | OXYGEN SATURATION: 92 %

## 2019-01-01 VITALS
DIASTOLIC BLOOD PRESSURE: 66 MMHG | BODY MASS INDEX: 25.91 KG/M2 | WEIGHT: 171 LBS | SYSTOLIC BLOOD PRESSURE: 104 MMHG | HEART RATE: 91 BPM | TEMPERATURE: 98.4 F | HEIGHT: 68 IN | OXYGEN SATURATION: 92 %

## 2019-01-01 DIAGNOSIS — J44.9 CHRONIC OBSTRUCTIVE PULMONARY DISEASE, UNSPECIFIED COPD TYPE (HCC): Primary | ICD-10-CM

## 2019-01-01 DIAGNOSIS — Z72.0 TOBACCO ABUSE: ICD-10-CM

## 2019-01-01 DIAGNOSIS — Z87.820 HISTORY OF TRAUMATIC BRAIN INJURY: ICD-10-CM

## 2019-01-01 DIAGNOSIS — I10 ESSENTIAL HYPERTENSION: ICD-10-CM

## 2019-01-01 DIAGNOSIS — R09.02 HYPOXEMIA: ICD-10-CM

## 2019-01-01 PROCEDURE — 99213 OFFICE O/P EST LOW 20 MIN: CPT | Performed by: FAMILY MEDICINE

## 2019-01-01 RX ORDER — ALBUTEROL SULFATE 1.25 MG/3ML
1 SOLUTION RESPIRATORY (INHALATION) EVERY 6 HOURS PRN
Qty: 360 VIAL | Refills: 5 | Status: SHIPPED | OUTPATIENT
Start: 2019-01-01

## 2019-01-01 RX ORDER — ALBUTEROL SULFATE 1.25 MG/3ML
SOLUTION RESPIRATORY (INHALATION)
Qty: 120 VIAL | Refills: 3 | Status: SHIPPED | OUTPATIENT
Start: 2019-01-01 | End: 2019-01-01 | Stop reason: SDUPTHER

## 2019-01-01 RX ORDER — LEVOFLOXACIN 500 MG/1
500 TABLET, FILM COATED ORAL DAILY
Qty: 10 TABLET | Refills: 0 | Status: SHIPPED | OUTPATIENT
Start: 2019-01-01

## 2019-01-01 RX ORDER — ALBUTEROL SULFATE 1.25 MG/3ML
1 SOLUTION RESPIRATORY (INHALATION) EVERY 6 HOURS PRN
Qty: 120 VIAL | Refills: 3 | Status: SHIPPED | OUTPATIENT
Start: 2019-01-01 | End: 2019-01-01 | Stop reason: SDUPTHER

## 2019-01-01 RX ORDER — LISINOPRIL 40 MG/1
TABLET ORAL
Qty: 30 TABLET | Refills: 6 | Status: SHIPPED | OUTPATIENT
Start: 2019-01-01

## 2019-01-04 RX ORDER — LISINOPRIL 40 MG/1
40 TABLET ORAL DAILY
Qty: 30 TABLET | Refills: 6 | Status: SHIPPED | OUTPATIENT
Start: 2019-01-04 | End: 2019-01-01 | Stop reason: SDUPTHER

## 2019-04-15 ENCOUNTER — TELEPHONE (OUTPATIENT)
Dept: FAMILY MEDICINE CLINIC | Facility: CLINIC | Age: 59
End: 2019-04-15

## 2019-04-19 ENCOUNTER — TELEPHONE (OUTPATIENT)
Dept: FAMILY MEDICINE CLINIC | Facility: CLINIC | Age: 59
End: 2019-04-19

## 2019-04-19 NOTE — TELEPHONE ENCOUNTER
OK called to Home Health, they will check with Rehab or Ortho re weight bearing.[facility]

## 2019-04-19 NOTE — TELEPHONE ENCOUNTER
HOME HEALTH NEEDING VERBALS ORDERS, 2 TIMES WEEK FOR 4 WEEKS  RE-ACCESS IN 4 WEEKS    ALSO, PT HAS A PELVIC FRACTURE, NOTHING TO SAY WHAT PT'S WEIGHT BEARING STATUS IS, NOR AN ORTHO DR NAME TO CALL

## 2019-04-22 ENCOUNTER — TELEPHONE (OUTPATIENT)
Dept: FAMILY MEDICINE CLINIC | Facility: CLINIC | Age: 59
End: 2019-04-22

## 2019-04-30 ENCOUNTER — TELEPHONE (OUTPATIENT)
Dept: FAMILY MEDICINE CLINIC | Facility: CLINIC | Age: 59
End: 2019-04-30

## 2019-05-08 ENCOUNTER — TELEPHONE (OUTPATIENT)
Dept: FAMILY MEDICINE CLINIC | Facility: CLINIC | Age: 59
End: 2019-05-08

## 2019-05-08 NOTE — TELEPHONE ENCOUNTER
PATIENT IS HAVING LOW OXYGEN TODAY. 75% TO 88% ON ROOM AIR. LILLIAM FROM [facility] CALLED PATIENTS  AND POA AND TOLD THEM RECOMMENDATION IS TO GO TO THE ER. PATIENT IS DECLINING GOING TO THE ER

## 2019-05-09 ENCOUNTER — TELEPHONE (OUTPATIENT)
Dept: FAMILY MEDICINE CLINIC | Facility: CLINIC | Age: 59
End: 2019-05-09

## 2019-05-23 NOTE — TELEPHONE ENCOUNTER
PHYSICAL THERAPIST CALLED TO LET  KNOW HE BELIEVES THE PT IS NOT GOING TO BE ABLE TO CONTINUE THERAPY, STATING HE ACTS OUT AND SINCE HE LIVES ALONE THERE IS NO ONE THERE TO REINFORCE ANYTHING.  ASKED IF SOMEONE COULD PLEASE GIVE HIM A CALL BACK TODAY AS HE WILL BE LEAVING OUT OF TOWN IN THE MORNING AND WANTS TO GET THE ORDERS FOR DISCHARGE SENT IN TODAY IF POSSIBLE

## 2019-05-29 NOTE — TELEPHONE ENCOUNTER
PATIENT IS ON NICOTINE PATCHES AND IS ALSO SMOKING. IS THIS OK TO BE SMOKING AND ON PATCHES AT THE SAME TIME.
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
## 2019-06-10 NOTE — TELEPHONE ENCOUNTER
Pt was seen in the er last month and the dr didn't put a dx code on the rx he pres for albuterol-ipratropium (DUO-NEB) 0.5-2.5 mg/3 mL nebulizer Take 1 ampule by nebulization Every 4 hours for 30 days.  Qty: 180 ampule, Refills: 0     Pt wants to know if you will send over a new pres with dx code.     Notes are in the computer form 05/08/19 admission     Send to [facility] on file

## 2019-06-13 NOTE — TELEPHONE ENCOUNTER
OK to do PT and OT faxed demographic and last office note to [phone]----- Message from Blake Topete MD sent at 6/13/2019  1:53 PM EDT -----  Yes  ----- Message -----  From: Anastasiia Burns MA  Sent: 6/13/2019   1:31 PM  To: Blake Topete MD    [facility] would like to know if we can order PT and OT on this patient for unsteady gait? Phone [phone] Fax [phone]

## 2019-10-10 NOTE — TELEPHONE ENCOUNTER
Per Dr. Topete, pt needs to go to er. Spoke with Shazia (home health nurse) and the pt refused to go to the er. She offered to take him and he would not go. I called and spoke with pt, he said he is fine and doesn't feel any different. I explained to him this is very dangerous and he needs medical treatment. He said he is fine and he is ok. I offered an ambulance and he said no.

## 2019-10-15 NOTE — TELEPHONE ENCOUNTER
Left message for Chino to call back to go out for assessment today faxed orders to [facility] for home oxygen.

## 2019-10-15 NOTE — TELEPHONE ENCOUNTER
PT MOTHER CALLED STS THAT SHE NEEDS TO SPEAK WITH DR MOREL HER SON  .WAS IN THE HOSPITAL AND HE CHECKED HIS SELF OUT ..PLEASE CALL HER [phone].....

## 2019-10-15 NOTE — TELEPHONE ENCOUNTER
Did you read their notes, since they were admitting there were no meds listed so she wants to know what to do from here

## 2019-10-15 NOTE — TELEPHONE ENCOUNTER
PATIENT WAS IN [facility] FOR LOW O2 AND WAS DIAGNOSED WITH PNEUMONIA. PATIENT LEFT THE HOSPITAL AGAINST MEDICAL ADVISE.  IS TRYING TO GET HOSPITAL RECORDS TO HELP PATIENT GET RX'S HE WAS PRESCRIBED. CAN WE GET THE RECORDS SO CARE MANAGER CAN HELP PATIENT

## 2019-10-15 NOTE — TELEPHONE ENCOUNTER
Per Dr. Topete,Advised  he needed to be admitted yesterday, she will have his POA see if there is anything that can be done to make him go back to ER, he needed in patient treatment per notes at [facility].

## 2019-10-15 NOTE — TELEPHONE ENCOUNTER
I cannot do this.  With his low O2 sats and possible pneumonia he needs to go back to the hospital indeed admitted as a recommended the first time.

## 2019-10-15 NOTE — TELEPHONE ENCOUNTER
See if you can find any phone numbers at which he or his caregiver can be reached and let them know that particular number.

## 2019-10-15 NOTE — TELEPHONE ENCOUNTER
I talked to the patient's mother.  She does not know what to do.  I reviewed his chart and his findings.  He tested negative for pulmonary embolus and possibly for pneumonia.  He is hypoxic.  My plan is to call in Levaquin 500 mg daily and have home health go to his house and evaluate him and place him on oxygen 2 L.  Other than that I am not sure what else to do since he is adamant and refuses any hospital admission.  He was actually admitted and left AMA.  I told her if there is any way of changing his mind to work on him.  Otherwise if he continues to deteriorate just call 911 and take him to the emergency room.

## 2019-10-15 NOTE — TELEPHONE ENCOUNTER
PATIENTS  STATED THAT PATIENT IS A SMOKER AND DOES NOT PLAN TO QUIT WOULD NOT BE A GOOD OPTION ON PUTTING PATIENT ON OXYGEN

## 2019-10-16 NOTE — TELEPHONE ENCOUNTER
Patient was admitted to hospital last pm, faxed cancellation order for oxygen to [facility].

## 2019-10-21 NOTE — TELEPHONE ENCOUNTER
JENNY CALLED TO GIVE DR. MOREL AN UPDATE REGARDING PT'S OXYGEN.  SAYS PT DID GO TO THE HOSPITAL OVER LOW O2 LEVELS. HE LEFT AMA. THE NURSING FACILITY HAD ASKED IF THEY COULD TEST HIM TO SEE IF HE NEEDED AN OXYGEN MACHINE AT HOME, BUT HOSPITAL SAID THEY WERE RESPONSIBLE FOR THAT. NURSING FACILITY SAYS IT IS THE OTHER WAY AROUND.   PT LEFT NURSING HOME AND HAS STAYED WITH MOM SINCE Saturday.   ADVISED HER TO GET HIS PULSE OX SO SHE CAN KEEP UP WITH HIS LEVELS, SAID THE LAST TIME THE LEVELS WERE CHECKED, HE WAS AT 97 (SHE DOES NOT KNOW WHEN THAT WAS THOUGH)
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
## 2019-10-23 NOTE — TELEPHONE ENCOUNTER
Have her check it twice a night.  Obviously this is too low and he will get in trouble again.  If he could stay off cigarettes then we can get the oxygen people to have oxygen for him.  This is a Catch-22 situation.  If he runs into trouble he should go to the emergency room.

## 2019-10-23 NOTE — TELEPHONE ENCOUNTER
HIS OXYGEN LEVEL WAS 88 THE FIRST TIME 73 SECOND THEN 83 THE LAST TIME. PLEASE ADVISE. WAS TOLD TO CALL THIS MORNING WITH LEVELS.

## 2019-10-24 NOTE — TELEPHONE ENCOUNTER
Left message for patient to call back re oxygen levels, she was to call yesterday after home health came to check him and oxygen sat levels

## 2020-01-01 ENCOUNTER — TELEPHONE (OUTPATIENT)
Dept: FAMILY MEDICINE CLINIC | Facility: CLINIC | Age: 60
End: 2020-01-01

## 2020-04-10 NOTE — TELEPHONE ENCOUNTER
Shazia with [facility]  is calling to report patients 02 stats were as follows:    04/09/20  79  04/10/20   86      [facility] call back #[phone]

## 2020-04-10 NOTE — TELEPHONE ENCOUNTER
Shazia with [facility], , wanted to let  know that the patient willingly got on the ems and is on his way to the hospital right now after Karena Reyes was concerned with his oxygen levels

## 2020-04-10 NOTE — TELEPHONE ENCOUNTER
Called nurse 04/10/20 patient oxygen level 79 last night and this AM 86, his mother with him and home health nurse not with currently with him. Called mother Odalys 12:45 not with him currently states he lives down the street at his house, concern that oxygen is low, COPD and possible pneumonia, mother states he refuses to go to hospital. Offered to call the ambulance to the house she states he will refuse to go. Stressed importance of possible death and told him to go to hospital. Again mom states patient refused. Called patient 12:47 instructed to not leave voicemail on mailbox. Called [facility] home health  back and left voicemail detailing actions and concern for noncompliance, we may not continue to see patient as noncompliant and risking death

## 2020-09-17 NOTE — TELEPHONE ENCOUNTER
OK for OT called to home health [facility]   Xolair Counseling:  Patient informed of potential adverse effects including but not limited to fever, muscle aches, rash and allergic reactions.  The patient verbalized understanding of the proper use and possible adverse effects of Xolair.  All of the patient's questions and concerns were addressed.